# Patient Record
Sex: FEMALE | Race: WHITE
[De-identification: names, ages, dates, MRNs, and addresses within clinical notes are randomized per-mention and may not be internally consistent; named-entity substitution may affect disease eponyms.]

---

## 2019-09-20 ENCOUNTER — HOSPITAL ENCOUNTER (EMERGENCY)
Dept: HOSPITAL 74 - JER | Age: 64
LOS: 1 days | Discharge: HOME | End: 2019-09-21
Payer: COMMERCIAL

## 2019-09-20 VITALS — BODY MASS INDEX: 61.1 KG/M2

## 2019-09-20 DIAGNOSIS — Z85.3: ICD-10-CM

## 2019-09-20 DIAGNOSIS — R07.89: Primary | ICD-10-CM

## 2019-09-20 DIAGNOSIS — I10: ICD-10-CM

## 2019-09-20 DIAGNOSIS — I69.854: ICD-10-CM

## 2019-09-20 PROCEDURE — 3E033NZ INTRODUCTION OF ANALGESICS, HYPNOTICS, SEDATIVES INTO PERIPHERAL VEIN, PERCUTANEOUS APPROACH: ICD-10-PCS | Performed by: EMERGENCY MEDICINE

## 2019-09-20 PROCEDURE — 3E033GC INTRODUCTION OF OTHER THERAPEUTIC SUBSTANCE INTO PERIPHERAL VEIN, PERCUTANEOUS APPROACH: ICD-10-PCS | Performed by: EMERGENCY MEDICINE

## 2019-09-21 VITALS — SYSTOLIC BLOOD PRESSURE: 158 MMHG | HEART RATE: 82 BPM | TEMPERATURE: 98.2 F | DIASTOLIC BLOOD PRESSURE: 82 MMHG

## 2019-09-21 LAB
ALBUMIN SERPL-MCNC: 4 G/DL (ref 3.4–5)
ALP SERPL-CCNC: 74 U/L (ref 45–117)
ALT SERPL-CCNC: 22 U/L (ref 13–61)
ANION GAP SERPL CALC-SCNC: 7 MMOL/L (ref 8–16)
AST SERPL-CCNC: 18 U/L (ref 15–37)
BASOPHILS # BLD: 0.6 % (ref 0–2)
BILIRUB SERPL-MCNC: 0.5 MG/DL (ref 0.2–1)
BUN SERPL-MCNC: 14.8 MG/DL (ref 7–18)
CALCIUM SERPL-MCNC: 9.4 MG/DL (ref 8.5–10.1)
CHLORIDE SERPL-SCNC: 104 MMOL/L (ref 98–107)
CO2 SERPL-SCNC: 26 MMOL/L (ref 21–32)
CREAT SERPL-MCNC: 1 MG/DL (ref 0.55–1.3)
DEPRECATED RDW RBC AUTO: 13.5 % (ref 11.6–15.6)
EOSINOPHIL # BLD: 0.9 % (ref 0–4.5)
GLUCOSE SERPL-MCNC: 105 MG/DL (ref 74–106)
HCT VFR BLD CALC: 38.4 % (ref 32.4–45.2)
HGB BLD-MCNC: 13 GM/DL (ref 10.7–15.3)
INR BLD: 0.96 (ref 0.83–1.09)
LYMPHOCYTES # BLD: 13.4 % (ref 8–40)
MCH RBC QN AUTO: 30.3 PG (ref 25.7–33.7)
MCHC RBC AUTO-ENTMCNC: 33.8 G/DL (ref 32–36)
MCV RBC: 89.6 FL (ref 80–96)
MONOCYTES # BLD AUTO: 7.6 % (ref 3.8–10.2)
NEUTROPHILS # BLD: 77.5 % (ref 42.8–82.8)
PLATELET # BLD AUTO: 147 K/MM3 (ref 134–434)
PMV BLD: 10 FL (ref 7.5–11.1)
POTASSIUM SERPLBLD-SCNC: 3.9 MMOL/L (ref 3.5–5.1)
PROT SERPL-MCNC: 7.6 G/DL (ref 6.4–8.2)
PT PNL PPP: 11.3 SEC (ref 9.7–13)
RBC # BLD AUTO: 4.28 M/MM3 (ref 3.6–5.2)
SODIUM SERPL-SCNC: 138 MMOL/L (ref 136–145)
WBC # BLD AUTO: 10 K/MM3 (ref 4–10)

## 2019-09-21 NOTE — EKG
Test Reason : 

Blood Pressure : ***/*** mmHG

Vent. Rate : 071 BPM     Atrial Rate : 071 BPM

   P-R Int : 148 ms          QRS Dur : 078 ms

    QT Int : 396 ms       P-R-T Axes : 043 -15 048 degrees

   QTc Int : 430 ms

 

NORMAL SINUS RHYTHM

NORMAL ECG

NO PREVIOUS ECGS AVAILABLE

Confirmed by GEMA WETZEL MD ([id number]) on 9/21/2019 11:44:09 PM

 

Referred By:             Confirmed By:GEMA WETZEL MD

## 2019-09-21 NOTE — PDOC
*Physical Exam





- Vital Signs


 Last Vital Signs











Temp Pulse Resp BP Pulse Ox


 


 98.2 F   82   20   158/82   98 


 


 09/21/19 00:05  09/21/19 00:05  09/21/19 00:05  09/21/19 00:05  09/21/19 00:05














<Tori Watkins - Last Filed: 09/21/19 04:30>





- Vital Signs


 Last Vital Signs











Temp Pulse Resp BP Pulse Ox


 


 98.2 F   82   20   158/82   98 


 


 09/21/19 00:05  09/21/19 00:05  09/21/19 00:05  09/21/19 00:05  09/21/19 00:05














<Lorie Acosta - Last Filed: 09/21/19 04:33>





ED Treatment Course





- LABORATORY


CBC & Chemistry Diagram: 


 09/21/19 02:08





 09/21/19 02:08





- Medications


Given in the ED: 


ED Medications














Discontinued Medications














Generic Name Dose Route Start Last Admin





  Trade Name Porter  PRN Reason Stop Dose Admin


 


Acetaminophen  1,000 mg  09/21/19 02:20  09/21/19 02:54





  Ofirmev Injection -  IVPB  09/21/19 02:21  1,000 mg





  ONCE ONE   Administration





     





     





     





     


 


Famotidine/Sodium Chloride  20 mg in 50 mls @ 100 mls/hr  09/21/19 02:20  09/21/ 19 02:54





  Pepcid 20 Mg Premixed Ivpb -  IVPB  09/21/19 02:49  100 mls/hr





  ONCE ONE   Administration





     





     





     





     


 


Sodium Chloride  1,000 ml  09/21/19 02:21  09/21/19 02:55





  Normal Saline -  IV  09/21/19 02:22  1,000 ml





  ONCE ONE   Administration





     





     





     





     














<Tori Watkins - Last Filed: 09/21/19 04:30>





- LABORATORY


CBC & Chemistry Diagram: 


 09/21/19 02:08





 09/21/19 02:08





- ADDITIONAL ORDERS


Additional order review: 


 Laboratory  Results











  09/21/19 09/21/19 09/21/19





  02:08 02:08 02:08


 


PT with INR  11.30  


 


INR  0.96  


 


Sodium   138 


 


Potassium   3.9 


 


Chloride   104 


 


Carbon Dioxide   26 


 


Anion Gap   No Result Required. 


 


BUN   14.8 


 


Creatinine   1.0 


 


Est GFR (CKD-EPI)AfAm   69.44 


 


Est GFR (CKD-EPI)NonAf   59.91 


 


Random Glucose   105 


 


Calcium   9.4 


 


Total Bilirubin   0.5 


 


AST   18 


 


ALT   22 


 


Alkaline Phosphatase   74 


 


Creatine Kinase    168


 


Troponin I    < 0.02


 


Total Protein   7.6 


 


Albumin   4.0 








 











  09/21/19





  02:08


 


RBC  4.28


 


MCV  89.6


 


MCHC  33.8


 


RDW  13.5


 


MPV  10.0


 


Neutrophils %  77.5


 


Lymphocytes %  13.4


 


Monocytes %  7.6


 


Eosinophils %  0.9


 


Basophils %  0.6














- Medications


Given in the ED: 


ED Medications














Discontinued Medications














Generic Name Dose Route Start Last Admin





  Trade Name Porter  PRN Reason Stop Dose Admin


 


Acetaminophen  1,000 mg  09/21/19 02:20  09/21/19 02:54





  Ofirmev Injection -  IVPB  09/21/19 02:21  1,000 mg





  ONCE ONE   Administration





     





     





     





     


 


Famotidine/Sodium Chloride  20 mg in 50 mls @ 100 mls/hr  09/21/19 02:20  09/21/ 19 02:54





  Pepcid 20 Mg Premixed Ivpb -  IVPB  09/21/19 02:49  100 mls/hr





  ONCE ONE   Administration





     





     





     





     


 


Sodium Chloride  1,000 ml  09/21/19 02:21  09/21/19 02:55





  Normal Saline -  IV  09/21/19 02:22  1,000 ml





  ONCE ONE   Administration





     





     





     





     














<Lorie Acosta - Last Filed: 09/21/19 04:33>





Medical Decision Making





- Medical Decision Making





09/21/19 03:28


-Signout received from Dr. Braun


-65 yo F with a hx of breast cancer (right cancer 2013 s/p radiation), CVA (2013

, residual left arm and left leg weakness), and HTN presents to the emergency 

department with chest pain radiating to the neck and left arm.


-Patient seen at bedside, resting comfortably


-Labs pending. CXR reviewed, no acute pathology noted


-EKG reviewed normal, no acute ischemic changes


-Contacted lab for results, told lab is having difficulty with equipment. Will 

dispo after receiving results. 


09/21/19 04:28


-CMP grossly normal. 


-Will discharge patient home. 











<Tori Watkins - Last Filed: 09/21/19 04:30>





- Medical Decision Making








09/21/19 04:32


Chemistries and trop normal; pt feels great and she is ready to go home.  

Follow with PMD Margo in the [place].  Last visit to her PMD was 3 mos ago.  Pt 

has no Fam hx of CAD.  She has HTN only. Siblings have only cancer.





<Lorie Acosta - Last Filed: 09/21/19 04:33>





*DC/Admit/Observation/Transfer





<Tori Watkins - Last Filed: 09/21/19 04:30>





<Lorie Acosta - Last Filed: 09/21/19 04:33>


Diagnosis at time of Disposition: 


 Chest pain








- Discharge Dispostion


Disposition: HOME


Condition at time of disposition: Improved





- Referrals


Referrals: 


ON STAFF,NOT [Primary Care Provider] - 





- Patient Instructions


Printed Discharge Instructions:  DI for Chest Pain


Additional Instructions: 


If you worsening pain, shortness of breath, palpitations, excessive sweating, 

fever or other concerning symptoms please return to the ER.


Please follow up with your primary care doctor in the next week. 





- Post Discharge Activity

## 2019-09-21 NOTE — PDOC
History of Present Illness





- General


Chief Complaint: Chest Pain


Stated Complaint: CHEST PAIN


Time Seen by Provider: 09/21/19 00:40


History Source: Patient


Exam Limitations: Language Barrier (rejected interpretor. )





- History of Present Illness


Initial Comments: 





09/21/19 00:56


65 yo F with a hx of breast cancer (right cancer 2013 s/p radiation), CVA (2013

, residual left arm and left leg weakness), and HTN presents to the emergency 

department with chest pain radiating to the neck and left arm. Per the patient, 

sensation is pressure like quality, relieves with burping, worsens with exertion

, and no prior episode. Per the patient, she denies recent travels, hx of PE/DVT

, and recent immobilization. She had a cardiac work up 1 year ago that was 

negative. Denies the following: diaphoresis, tearing chest pain, nausea, and 

vomiting. Endorses SOB. Received 324 mg aspirin in ambulance. 





Allergies: NKDA


Meds: lisinopril 2.5 mg, metoprolol 25 mg, and aspirin 81 mg








Past History





- Past Medical History


Allergies/Adverse Reactions: 


 Allergies











Allergy/AdvReac Type Severity Reaction Status Date / Time


 


No Known Allergies Allergy   Verified 09/21/19 00:08











Home Medications: 


Ambulatory Orders





Aspirin 81 mg PO DAILY 09/21/19 


Metoprolol/Hydrochlorothiazide [Metoprolol-Hctz 50-25 mg Tab] 25 mg PO DAILY 09/ 21/19 








Cancer: Yes


COPD: No


HTN: Yes


Other medical history: stroke, right breast ca





- Suicide/Smoking/Psychosocial Hx


Smoking History: Never smoked


Have you smoked in the past 12 months: No


Information on smoking cessation initiated: No


Hx Alcohol Use: No


Drug/Substance Use Hx: No





**Review of Systems





- Review of Systems


Able to Perform ROS?: Yes


Is the patient limited English proficient: No


Constitutional: No: Chills, Diaphoresis, Fever, Weakness


HEENTM: No: Eye Pain, Ear Pain, Nose Pain, Throat Pain, Mouth Pain


Respiratory: Yes: Cough, Shortness of Breath.  No: Hemoptysis


Cardiac (ROS): Yes: Chest Pain.  No: Lightheadedness, Palpitations, Syncope


ABD/GI: No: Constipated, Diarrhea, Nausea, Rectal Bleeding, Vomiting, Tarry 

Stools


: No: Burning, Dysuria, Hematuria, Incontinence


Musculoskeletal: Yes: Neck Pain.  No: Back Pain, Joint Pain


Integumentary: No: Bruising, Erythema, Rash


Neurological: No: Headache, Numbness, Tingling, Tremors


Psychiatric: No: Change in Appetite


Endocrine: No: Unexplained Weight Gain





*Physical Exam





- Vital Signs


 Last Vital Signs











Temp Pulse Resp BP Pulse Ox


 


 98.2 F   82   20   158/82   98 


 


 09/21/19 00:05  09/21/19 00:05  09/21/19 00:05  09/21/19 00:05  09/21/19 00:05














- Physical Exam


General Appearance: Yes: Nourished, Appropriately Dressed.  No: Apparent 

Distress, Intoxicated


HEENT: positive: EOMI, MEMO, Normal Voice, Symmetrical, Hearing Grossly Normal.

  negative: Pale Conjunctivae, Scleral Icterus (R), Scleral Icterus (L), Muffled

/Hoarse voice, Excessive drooling


Neck: positive: Trachea midline, Supple.  negative: Tender, Lymphadenopathy (R)

, Lymphadenopathy (L), Tender lateral, Tender midline


Respiratory/Chest: positive: Lungs Clear, Normal Breath Sounds.  negative: 

Chest Tender, Respiratory Distress, Accessory Muscle Use


Cardiovascular: positive: Regular Rhythm, Regular Rate, S1, S2.  negative: 

Systolic Murmur


Gastrointestinal/Abdominal: positive: Normal Bowel Sounds, Flat, Soft.  negative

: Tender, Distended, Guarding, Rebound


Lymphatic: negative: Adenopathy


Musculoskeletal: positive: Normal Inspection.  negative: CVA Tenderness, 

Vertebral Tenderness


Extremity: positive: Normal Capillary Refill, Normal Inspection, Normal Range 

of Motion.  negative: Tender


Integumentary: positive: Normal Color, Dry, Warm


Neurologic: positive: Fully Oriented, Alert, Normal Mood/Affect





**Heart Score/ECG Review





- History


History: Moderately suspicious





- Electrocardiogram


EKG: Normal





- Age


Age: 45-65





- Risk Factors


Risk Factors Heart Score: Yes Hx Hypertension


Based on the list above the patient has:: 1-2 risk factors





- Troponin


Troponin: </= normal limit





- Score


Heart Score - Total: 3





ED Treatment Course





- LABORATORY


CBC & Chemistry Diagram: 


 09/21/19 02:08





 09/21/19 02:08





- RADIOLOGY


Radiology Studies Ordered: 














 Category Date Time Status


 


 CHEST PA & LAT [RAD] Stat Radiology  09/21/19 00:29 Ordered














Medical Decision Making





- Medical Decision Making





65 yo F with a hx of breast cancer (right cancer 2013 s/p radiation), CVA (2013

, residual left arm and left leg weakness), and HTN presents to the emergency 

department with chest pain radiating to the neck and left arm.





Initial vitals:


 Initial Vital Signs











Temp Pulse Resp BP Pulse Ox


 


 98.2 F   82   20   158/82   98 


 


 09/21/19 00:05  09/21/19 00:05  09/21/19 00:05  09/21/19 00:05  09/21/19 00:05








Work up:


ACS rule out.


EKG: NSR without ST elevations or depressions. No TWI. Ventricular rate 71 bpm. 


Labs: cbc, cmp, trops, cxr. 





Patient was signed out to Dr. Valentin











*DC/Admit/Observation/Transfer


Diagnosis at time of Disposition: 


 Chest pain








- Referrals


Referrals: 


ON STAFF,NOT [Primary Care Provider] - 





- Patient Instructions


Printed Discharge Instructions:  DI for Chest Pain


Additional Instructions: 


If you worsening pain, shortness of breath, palpitations, excessive sweating, 

fever or other concerning symptoms please return to the ER.


Please follow up with your primary care doctor in the next week. 





- Post Discharge Activity

## 2019-09-21 NOTE — PDOC
Documentation entered by Shara Rowland SCRIBE, acting as scribe for Eric Hatfield MD.








Eric Hatfield MD:  This documentation has been prepared by the Janett salguero Xhesika, SCRIBE, under my direction and personally reviewed by me in its 

entirety.  I confirm that the documentation accurately reflects all work, 

treatment, procedures, and medical decision making performed by me.  





Attending Attestation





- Resident


Resident Name: Germain Braun





- ED Attending Attestation


I have performed the following: I have examined & evaluated the patient, The 

case was reviewed & discussed with the resident, I agree w/resident's findings 

& plan, Exceptions are as noted





- HPI


HPI: 





09/21/19 01:39


The patient is a 64 year old female with a PMH of breast cancer (right cancer 

2013 s/p radiation), CVA (2013, residual left arm and left leg weakness), and 

HTN who presents to the ED BIBA for chest pain since 7pm. Patient notes CP 

feels like someone is punching her, is intermittent, radiates to her neck and 

L arm, and worsened with movement. Patient denies any recent travel, long car 

rides. Pt denies any history of PE or DVT. 





The patient denies shortness of breath, headache and dizziness. Denies fever, 

chills, cough, nausea, vomiting, diarrhea and constipation. Denies dysuria, 

frequency, urgency and hematuria.





Allergies:, NKDA


Social Hx: Denies current smoking, drinking, or other substance usage. 








- Physicial Exam


PE: 





09/21/19 01:40





Vitals: Triage Vital signs reviewed


General Appearance:  no acute distress, well nourished well developed,


Neck:  Supple;No Nuchal rigidity


Chest Wall: Nontender


Cardiac: Regular rate and rhythm, no murmurs, no rubs, no gallops,


Lungs: Clear to auscultation bilateral, good air movement bilaterally,


Abdomen:  Soft, nondistended, normal bowel sounds, nontender to palpation


Extremities:(+) mild L arm pain reproducible with movement.  Moving all 

extremities, no cyanosis, clubbing, or edema


Skin:  Warm and dry, no rashes or lesions, no petechiae


Neuro: AOX3; Cranial Nerves 2-12 grossly c intact, Strength intact to all 

extremities, Sensation intact to all extremities. 


Psych:  normal mood, normal affect








- Medical Decision Making





09/22/19 21:43


64 years old atypical chest discomfort more likely musculoskeletal 

radiculopathy EKG unremarkable troponin pending if troponin negative heart 

score is 3 patient would be safe for outpatient follow-up





 to follow up labs and dispo








**Heart Score/ECG Review





- ECG Impressions


Comment:: 





09/22/19 21:43


No ST elevations no T-wave inversions





Interpreted by me.

## 2019-09-25 NOTE — EKG
Test Reason : 

Blood Pressure : ***/*** mmHG

Vent. Rate : 068 BPM     Atrial Rate : 068 BPM

   P-R Int : 150 ms          QRS Dur : 078 ms

    QT Int : 416 ms       P-R-T Axes : 037 -27 065 degrees

   QTc Int : 442 ms

 

NORMAL SINUS RHYTHM

NORMAL ECG

WHEN COMPARED WITH ECG OF 20-SEP-2019 23:58,

NO SIGNIFICANT CHANGE WAS FOUND

Confirmed by GAVINO BANDA MD ([id number]) on 9/25/2019 12:47:16 PM

 

Referred By:             Confirmed By:GAVINO BANDA MD